# Patient Record
Sex: MALE | Race: WHITE | NOT HISPANIC OR LATINO | Employment: STUDENT | URBAN - METROPOLITAN AREA
[De-identification: names, ages, dates, MRNs, and addresses within clinical notes are randomized per-mention and may not be internally consistent; named-entity substitution may affect disease eponyms.]

---
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
## 2025-06-02 DIAGNOSIS — Z00.00 HEALTH CARE MAINTENANCE: Primary | ICD-10-CM

## 2025-06-03 LAB
ALBUMIN SERPL-MCNC: 4.7 G/DL (ref 3.5–5.2)
ALBUMIN/GLOB SERPL: 1.9 G/DL
ALP SERPL-CCNC: 81 U/L (ref 39–117)
ALT SERPL-CCNC: 13 U/L (ref 1–41)
AST SERPL-CCNC: 16 U/L (ref 1–40)
BASOPHILS # BLD AUTO: 0.06 10*3/MM3 (ref 0–0.2)
BASOPHILS NFR BLD AUTO: 1 % (ref 0–1.5)
BILIRUB SERPL-MCNC: 0.5 MG/DL (ref 0–1.2)
BUN SERPL-MCNC: 15 MG/DL (ref 6–20)
BUN/CREAT SERPL: 15 (ref 7–25)
CALCIUM SERPL-MCNC: 9.9 MG/DL (ref 8.6–10.5)
CHLORIDE SERPL-SCNC: 102 MMOL/L (ref 98–107)
CHOLEST SERPL-MCNC: 195 MG/DL (ref 0–200)
CHOLEST/HDLC SERPL: 5.27 {RATIO}
CO2 SERPL-SCNC: 26.8 MMOL/L (ref 22–29)
CREAT SERPL-MCNC: 1 MG/DL (ref 0.76–1.27)
EGFRCR SERPLBLD CKD-EPI 2021: 111.2 ML/MIN/1.73
EOSINOPHIL # BLD AUTO: 0.25 10*3/MM3 (ref 0–0.4)
EOSINOPHIL NFR BLD AUTO: 4.4 % (ref 0.3–6.2)
ERYTHROCYTE [DISTWIDTH] IN BLOOD BY AUTOMATED COUNT: 12.1 % (ref 12.3–15.4)
GLOBULIN SER CALC-MCNC: 2.5 GM/DL
GLUCOSE SERPL-MCNC: 95 MG/DL (ref 65–99)
HCT VFR BLD AUTO: 46.8 % (ref 37.5–51)
HDLC SERPL-MCNC: 37 MG/DL (ref 40–60)
HGB BLD-MCNC: 15.9 G/DL (ref 13–17.7)
IMM GRANULOCYTES # BLD AUTO: 0.01 10*3/MM3 (ref 0–0.05)
IMM GRANULOCYTES NFR BLD AUTO: 0.2 % (ref 0–0.5)
LDLC SERPL CALC-MCNC: 131 MG/DL (ref 0–100)
LYMPHOCYTES # BLD AUTO: 2.41 10*3/MM3 (ref 0.7–3.1)
LYMPHOCYTES NFR BLD AUTO: 42.1 % (ref 19.6–45.3)
MCH RBC QN AUTO: 30.8 PG (ref 26.6–33)
MCHC RBC AUTO-ENTMCNC: 34 G/DL (ref 31.5–35.7)
MCV RBC AUTO: 90.7 FL (ref 79–97)
MONOCYTES # BLD AUTO: 0.61 10*3/MM3 (ref 0.1–0.9)
MONOCYTES NFR BLD AUTO: 10.6 % (ref 5–12)
NEUTROPHILS # BLD AUTO: 2.39 10*3/MM3 (ref 1.7–7)
NEUTROPHILS NFR BLD AUTO: 41.7 % (ref 42.7–76)
NRBC BLD AUTO-RTO: 0 /100 WBC (ref 0–0.2)
PLATELET # BLD AUTO: 316 10*3/MM3 (ref 140–450)
POTASSIUM SERPL-SCNC: 4 MMOL/L (ref 3.5–5.2)
PROT SERPL-MCNC: 7.2 G/DL (ref 6–8.5)
RBC # BLD AUTO: 5.16 10*6/MM3 (ref 4.14–5.8)
SODIUM SERPL-SCNC: 140 MMOL/L (ref 136–145)
TRIGL SERPL-MCNC: 148 MG/DL (ref 0–150)
TSH SERPL DL<=0.005 MIU/L-ACNC: 2.95 UIU/ML (ref 0.27–4.2)
VLDLC SERPL CALC-MCNC: 27 MG/DL (ref 5–40)
WBC # BLD AUTO: 5.73 10*3/MM3 (ref 3.4–10.8)

## 2025-06-09 ENCOUNTER — OFFICE VISIT (OUTPATIENT)
Dept: INTERNAL MEDICINE | Facility: CLINIC | Age: 20
End: 2025-06-09
Payer: COMMERCIAL

## 2025-06-09 VITALS
DIASTOLIC BLOOD PRESSURE: 62 MMHG | HEART RATE: 80 BPM | WEIGHT: 182 LBS | SYSTOLIC BLOOD PRESSURE: 98 MMHG | HEIGHT: 67 IN | BODY MASS INDEX: 28.56 KG/M2 | OXYGEN SATURATION: 98 %

## 2025-06-09 DIAGNOSIS — Z00.00 HEALTH CARE MAINTENANCE: Primary | ICD-10-CM

## 2025-06-09 DIAGNOSIS — F95.2 TOURETTE'S: ICD-10-CM

## 2025-06-09 DIAGNOSIS — F41.9 ANXIETY: ICD-10-CM

## 2025-06-09 PROCEDURE — 99395 PREV VISIT EST AGE 18-39: CPT | Performed by: NURSE PRACTITIONER

## 2025-06-09 RX ORDER — CETIRIZINE HYDROCHLORIDE 10 MG/1
10 TABLET ORAL DAILY
COMMUNITY

## 2025-06-09 RX ORDER — SERTRALINE HYDROCHLORIDE 100 MG/1
100 TABLET, FILM COATED ORAL NIGHTLY
Qty: 90 TABLET | Refills: 1 | Status: SHIPPED | OUTPATIENT
Start: 2025-06-09

## 2025-06-09 NOTE — PROGRESS NOTES
Subjective   Prabhu Luong is a 19 y.o. male.     History of Present Illness   The patient is here today for CPE and lab work F/U. Feeling good. Working mediterranean style diet. Down 4 lbs.    Living at home with mom.     He is playing on the computer in his free time. 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     Neuro- tourettes- pt reports UTD, feel the zoloft is not doing much     Pediatric BMI = 91 %ile (Z= 1.36) based on CDC (Boys, 2-20 Years) BMI-for-age based on BMI available on 6/9/2025.. BMI is >= 25 and <30. (Overweight) The following options were offered after discussion;: exercise counseling/recommendations and nutrition counseling/recommendations     Here with twin brother, pt consents.   The following portions of the patient's history were reviewed and updated as appropriate: allergies, current medications, past family history, past medical history, past social history, past surgical history and problem list.    Review of Systems   Constitutional:  Negative for chills and fever.   HENT:  Negative for ear pain, rhinorrhea and sore throat.    Eyes: Negative.    Respiratory:  Negative for cough and shortness of breath.    Cardiovascular:  Positive for palpitations (described as a catch, has to hold his breath and then it resolves on its own, maybe occurs once monthly).   Gastrointestinal: Negative.    Endocrine: Negative for cold intolerance and heat intolerance.   Genitourinary:  Negative for decreased urine volume, difficulty urinating, discharge, dysuria, flank pain, frequency, genital sores, hematuria, penile pain, erectile dysfunction, testicular pain and urgency.   Musculoskeletal:  Positive for back pain.   Skin: Negative.    Allergic/Immunologic: Negative for environmental allergies and food allergies.   Neurological:  Positive for syncope (near syncope last week with blood draw). Negative for dizziness, tremors, seizures, facial asymmetry, speech difficulty, weakness,  light-headedness, numbness, headache, memory problem and confusion.   Hematological: Negative.    Psychiatric/Behavioral:  Negative for dysphoric mood and suicidal ideas. The patient is not nervous/anxious.        Objective   Physical Exam  Constitutional:       Appearance: Normal appearance. He is well-developed.   HENT:      Right Ear: Hearing, tympanic membrane, ear canal and external ear normal.      Left Ear: Hearing, tympanic membrane, ear canal and external ear normal.   Eyes:      General: Lids are normal.      Conjunctiva/sclera: Conjunctivae normal.      Pupils: Pupils are equal, round, and reactive to light.   Neck:      Thyroid: No thyromegaly.      Vascular: No carotid bruit.      Trachea: Trachea normal.   Cardiovascular:      Rate and Rhythm: Normal rate and regular rhythm.      Heart sounds: Normal heart sounds.   Pulmonary:      Effort: Pulmonary effort is normal.      Breath sounds: Normal breath sounds.   Abdominal:      General: Bowel sounds are normal.      Palpations: Abdomen is soft.      Tenderness: There is no abdominal tenderness.   Musculoskeletal:         General: Normal range of motion.      Cervical back: Normal range of motion and neck supple.   Lymphadenopathy:      Cervical: No cervical adenopathy.      Upper Body:      Right upper body: No supraclavicular adenopathy.      Left upper body: No supraclavicular adenopathy.   Skin:     General: Skin is warm and dry.   Neurological:      Mental Status: He is alert and oriented to person, place, and time.      Cranial Nerves: No cranial nerve deficit.      Sensory: No sensory deficit.      Deep Tendon Reflexes:      Reflex Scores:       Patellar reflexes are 2+ on the right side and 2+ on the left side.  Psychiatric:         Speech: Speech normal.         Behavior: Behavior normal.         Thought Content: Thought content normal.         Judgment: Judgment normal.         Vitals:    06/09/25 1018   BP: 98/62   Pulse: 80   SpO2: 98%      Body mass index is 28.77 kg/m².      Current Outpatient Medications:     cetirizine (zyrTEC) 10 MG tablet, Take 1 tablet by mouth Daily., Disp: , Rfl:     sertraline (ZOLOFT) 100 MG tablet, Take 1 tablet by mouth Every Night., Disp: 90 tablet, Rfl: 1   Assessment & Plan   Diagnoses and all orders for this visit:    1. Health care maintenance (Primary)    2. Tourette's    3. Anxiety  -     sertraline (ZOLOFT) 100 MG tablet; Take 1 tablet by mouth Every Night.  Dispense: 90 tablet; Refill: 1          Orders Only on 06/02/2025   Component Date Value Ref Range Status    WBC 06/02/2025 5.73  3.40 - 10.80 10*3/mm3 Final    RBC 06/02/2025 5.16  4.14 - 5.80 10*6/mm3 Final    Hemoglobin 06/02/2025 15.9  13.0 - 17.7 g/dL Final    Hematocrit 06/02/2025 46.8  37.5 - 51.0 % Final    MCV 06/02/2025 90.7  79.0 - 97.0 fL Final    MCH 06/02/2025 30.8  26.6 - 33.0 pg Final    MCHC 06/02/2025 34.0  31.5 - 35.7 g/dL Final    RDW 06/02/2025 12.1 (L)  12.3 - 15.4 % Final    Platelets 06/02/2025 316  140 - 450 10*3/mm3 Final    Neutrophil Rel % 06/02/2025 41.7 (L)  42.7 - 76.0 % Final    Lymphocyte Rel % 06/02/2025 42.1  19.6 - 45.3 % Final    Monocyte Rel % 06/02/2025 10.6  5.0 - 12.0 % Final    Eosinophil Rel % 06/02/2025 4.4  0.3 - 6.2 % Final    Basophil Rel % 06/02/2025 1.0  0.0 - 1.5 % Final    Neutrophils Absolute 06/02/2025 2.39  1.70 - 7.00 10*3/mm3 Final    Lymphocytes Absolute 06/02/2025 2.41  0.70 - 3.10 10*3/mm3 Final    Monocytes Absolute 06/02/2025 0.61  0.10 - 0.90 10*3/mm3 Final    Eosinophils Absolute 06/02/2025 0.25  0.00 - 0.40 10*3/mm3 Final    Basophils Absolute 06/02/2025 0.06  0.00 - 0.20 10*3/mm3 Final    Immature Granulocyte Rel % 06/02/2025 0.2  0.0 - 0.5 % Final    Immature Grans Absolute 06/02/2025 0.01  0.00 - 0.05 10*3/mm3 Final    nRBC 06/02/2025 0.0  0.0 - 0.2 /100 WBC Final    Glucose 06/02/2025 95  65 - 99 mg/dL Final    BUN 06/02/2025 15.0  6.0 - 20.0 mg/dL Final    Creatinine 06/02/2025 1.00  0.76  - 1.27 mg/dL Final    EGFR Result 06/02/2025 111.2  >60.0 mL/min/1.73 Final    Comment: GFR Categories in Chronic Kidney Disease (CKD)  GFR Category          GFR (mL/min/1.73)    Interpretation  G1                    90 or greater        Normal or high  (1)  G2                    60-89                Mild decrease  (1)  G3a                   45-59                Mild to moderate  decrease  G3b                   30-44                Moderate to  severe decrease  G4                    15-29                Severe decrease  G5                    14 or less           Kidney failure  (1)In the absence of evidence of kidney disease, neither  GFR category G1 or G2 fulfill the criteria for CKD.  eGFR calculation 2021 CKD-EPI creatinine equation, which  does not include race as a factor      BUN/Creatinine Ratio 06/02/2025 15.0  7.0 - 25.0 Final    Sodium 06/02/2025 140  136 - 145 mmol/L Final    Potassium 06/02/2025 4.0  3.5 - 5.2 mmol/L Final    Chloride 06/02/2025 102  98 - 107 mmol/L Final    Total CO2 06/02/2025 26.8  22.0 - 29.0 mmol/L Final    Calcium 06/02/2025 9.9  8.6 - 10.5 mg/dL Final    Total Protein 06/02/2025 7.2  6.0 - 8.5 g/dL Final    Albumin 06/02/2025 4.7  3.5 - 5.2 g/dL Final    Globulin 06/02/2025 2.5  gm/dL Final    A/G Ratio 06/02/2025 1.9  g/dL Final    Total Bilirubin 06/02/2025 0.5  0.0 - 1.2 mg/dL Final    Alkaline Phosphatase 06/02/2025 81  39 - 117 U/L Final    AST (SGOT) 06/02/2025 16  1 - 40 U/L Final    ALT (SGPT) 06/02/2025 13  1 - 41 U/L Final    Total Cholesterol 06/02/2025 195  0 - 200 mg/dL Final    Comment: Cholesterol Reference Ranges  (U.S. Department of Health and Human Services ATP III  Classifications)  Desirable          <200 mg/dL  Borderline High    200-239 mg/dL  High Risk          >240 mg/dL  Triglyceride Reference Ranges  (U.S. Department of Health and Human Services ATP III  Classifications)  Normal           <150 mg/dL  Borderline High  150-199 mg/dL  High              200-499 mg/dL  Very High        >500 mg/dL  HDL Reference Ranges  (U.S. Department of Health and Human Services ATP III  Classifications)  Low     <40 mg/dl (major risk factor for CHD)  High    >60 mg/dl ('negative' risk factor for CHD)  LDL Reference Ranges  (U.S. Department of Health and Human Services ATP III  Classifications)  Optimal          <100 mg/dL  Near Optimal     100-129 mg/dL  Borderline High  130-159 mg/dL  High             160-189 mg/dL  Very High        >189 mg/dL  LDL is calculated using the NIH LDL-C calculation.      Triglycerides 06/02/2025 148  0 - 150 mg/dL Final    HDL Cholesterol 06/02/2025 37 (L)  40 - 60 mg/dL Final    VLDL Cholesterol Sae 06/02/2025 27  5 - 40 mg/dL Final    LDL Chol Calc (NIH) 06/02/2025 131 (H)  0 - 100 mg/dL Final    Chol/HDL Ratio 06/02/2025 5.27   Final    TSH 06/02/2025 2.950  0.270 - 4.200 uIU/mL Final           1. Preventative counseling- continue to work on healthy diet  2. Tourette's- seeing neuro  3. Anxiety- feels the zoloft does not work, can try increasing the zoloft from 50 to 100 mg daily. F/u in 4 weeks.

## 2025-07-14 ENCOUNTER — TELEMEDICINE (OUTPATIENT)
Dept: INTERNAL MEDICINE | Facility: CLINIC | Age: 20
End: 2025-07-14
Payer: COMMERCIAL

## 2025-07-14 VITALS — HEIGHT: 67 IN | BODY MASS INDEX: 28.77 KG/M2

## 2025-07-14 DIAGNOSIS — L60.0 INGROWN TOENAIL: ICD-10-CM

## 2025-07-14 DIAGNOSIS — F41.9 ANXIETY: Primary | ICD-10-CM

## 2025-07-14 PROCEDURE — 99214 OFFICE O/P EST MOD 30 MIN: CPT | Performed by: NURSE PRACTITIONER

## 2025-07-14 NOTE — PATIENT INSTRUCTIONS
1. Anxiety- to see therapist, pt does not want to try anything different. Ok for pt to decrease 50 mg daily - he opts to continue the 100 mg daily  Discussed other SSRI options and possible wellbutrin.   2. In grown toenail- start epsom salts soaks and suggest podiatry

## 2025-07-14 NOTE — PROGRESS NOTES
Subjective   Prabhu Luong is a 20 y.o. male.      History of Present Illness   The patient is here today to F/U on anxiety, at last visit zoloft increased from 50 to 100 mg daily. This made no difference. No SEs.   Has not had panic attacks. Mainly worry, it does affect sleep sometimes.     He has been on guanfacine with out relief.     Tourettes- seems random, to see a specialist soon for this.     He notes an in grown toenail. He does not trim his nails very frequently. He denies puss like drainage. It hurts intermittent. No erythema.     The following portions of the patient's history were reviewed and updated as appropriate: allergies, current medications, past family history, past medical history, past social history, past surgical history and problem list.    Review of Systems   Constitutional: Negative.    Respiratory: Negative.     Cardiovascular: Negative.    Psychiatric/Behavioral:  Negative for suicidal ideas and depressed mood. The patient is nervous/anxious.        Objective   Physical Exam  Constitutional:       Appearance: Normal appearance.   Pulmonary:      Effort: Pulmonary effort is normal.   Neurological:      General: No focal deficit present.      Mental Status: He is alert.   Psychiatric:         Mood and Affect: Mood normal.         Behavior: Behavior normal.         Thought Content: Thought content normal.         Judgment: Judgment normal.         There were no vitals filed for this visit.  Body mass index is 28.77 kg/m².    Current Outpatient Medications:     cetirizine (zyrTEC) 10 MG tablet, Take 1 tablet by mouth Daily., Disp: , Rfl:     sertraline (ZOLOFT) 100 MG tablet, Take 1 tablet by mouth Every Night., Disp: 90 tablet, Rfl: 1     Assessment & Plan   Diagnoses and all orders for this visit:    1. Anxiety (Primary)    2. Ingrown toenail               1. Anxiety- agree with seeing therapist, pt does not want to try anything different. Ok for pt to decrease 50 mg daily - he opts to  continue the 100 mg daily  Discussed other SSRI options and possible wellbutrin.   2. In grown toenail- start epsom salts soaks and suggest podiatry